# Patient Record
Sex: MALE | Race: WHITE | NOT HISPANIC OR LATINO | URBAN - METROPOLITAN AREA
[De-identification: names, ages, dates, MRNs, and addresses within clinical notes are randomized per-mention and may not be internally consistent; named-entity substitution may affect disease eponyms.]

---

## 2024-09-15 ENCOUNTER — OFFICE VISIT (OUTPATIENT)
Dept: URGENT CARE | Age: 44
End: 2024-09-15
Payer: COMMERCIAL

## 2024-09-15 VITALS
TEMPERATURE: 98.2 F | HEART RATE: 80 BPM | RESPIRATION RATE: 16 BRPM | DIASTOLIC BLOOD PRESSURE: 98 MMHG | WEIGHT: 145 LBS | OXYGEN SATURATION: 98 % | SYSTOLIC BLOOD PRESSURE: 144 MMHG

## 2024-09-15 DIAGNOSIS — Z20.2 EXPOSURE TO GONORRHEA: Primary | ICD-10-CM

## 2024-09-15 PROCEDURE — 87491 CHLMYD TRACH DNA AMP PROBE: CPT

## 2024-09-15 PROCEDURE — 87591 N.GONORRHOEAE DNA AMP PROB: CPT

## 2024-09-15 PROCEDURE — 87661 TRICHOMONAS VAGINALIS AMPLIF: CPT

## 2024-09-15 NOTE — PATIENT INSTRUCTIONS
We will call with results, if positive, in 2-3 days. Do not have sex until appropriate treatment of yourself and your partner is completed. HIV and syphilis blood testing are recommended to complete STD testing. Contact your primary provider for this testing as soon as possible.

## 2024-09-15 NOTE — PROGRESS NOTES
Subjective   Patient ID: Trey MURRIETA is a 43 y.o. male. They present today with a chief complaint of Exposure to STD.    History of Present Illness  States he had sex with his girlfriend who was diagnosed with gonorrhea within the past week. Denies urinary symptoms, testicular pain, swelling, discharge, abdominal pain.      Exposure to STD      Past Medical History  Allergies as of 09/15/2024    (No Known Allergies)       (Not in a hospital admission)       No past medical history on file.    No past surgical history on file.     reports that he has been smoking cigarettes. He has been exposed to tobacco smoke. He uses smokeless tobacco.    Review of Systems  Pertinent systems reviewed and were negative unless otherwise stated in HPI.    Objective    Vitals:    09/15/24 1811   BP: (!) 144/98   Pulse: 80   Resp: 16   Temp: 36.8 °C (98.2 °F)   SpO2: 98%   Weight: 65.8 kg (145 lb)     No LMP for male patient.    Physical Exam  Constitutional:       General: He is not in acute distress.  Cardiovascular:      Rate and Rhythm: Normal rate and regular rhythm.   Pulmonary:      Effort: Pulmonary effort is normal.   Genitourinary:     Comments: Declined  Skin:     Findings: No rash.   Psychiatric:         Mood and Affect: Mood normal.         Behavior: Behavior normal.         Diagnostic study results (if any) were reviewed by Luc Rodarte PA-C.    Assessment/Plan   Allergies, medications, history, and pertinent labs/EKGs/imaging reviewed by Luc Rodarte PA-C.     Medical Decision Making  Advised patient he will need treatment if chlamydia or trichomonas are positive.    Orders and Diagnoses  Diagnoses and all orders for this visit:  Exposure to gonorrhea  -     cefTRIAXone (Rocephin) 500 mg in lidocaine (Xylocaine) 2 mL injection      Medical Admin Record      Disposition: Home    Electronically signed by Luc Rodarte PA-C

## 2024-09-16 LAB
C TRACH RRNA SPEC QL NAA+PROBE: NEGATIVE
N GONORRHOEA DNA SPEC QL PROBE+SIG AMP: NEGATIVE
T VAGINALIS RRNA SPEC QL NAA+PROBE: NEGATIVE